# Patient Record
Sex: FEMALE | Race: WHITE | NOT HISPANIC OR LATINO
[De-identification: names, ages, dates, MRNs, and addresses within clinical notes are randomized per-mention and may not be internally consistent; named-entity substitution may affect disease eponyms.]

---

## 2018-09-10 ENCOUNTER — TRANSCRIPTION ENCOUNTER (OUTPATIENT)
Age: 63
End: 2018-09-10

## 2022-11-21 ENCOUNTER — APPOINTMENT (OUTPATIENT)
Dept: ORTHOPEDIC SURGERY | Facility: CLINIC | Age: 67
End: 2022-11-21

## 2022-11-21 PROCEDURE — 99213 OFFICE O/P EST LOW 20 MIN: CPT | Mod: 25

## 2022-11-21 PROCEDURE — 76942 ECHO GUIDE FOR BIOPSY: CPT | Mod: RT

## 2022-11-21 PROCEDURE — 20551 NJX 1 TENDON ORIGIN/INSJ: CPT

## 2022-11-21 PROCEDURE — 76882 US LMTD JT/FCL EVL NVASC XTR: CPT

## 2022-11-21 PROCEDURE — J3490M: CUSTOM

## 2022-11-21 NOTE — HISTORY OF PRESENT ILLNESS
[de-identified] : Patient returns for her right plantar fasciitis. She last had an injection on 3/28/22 with great success. Pain returned recently. WB without assistive device. No new injury/trauma. Reports start-up pain.

## 2022-11-21 NOTE — PROCEDURE
[FreeTextEntry3] : Patient has tried OTC's including aspirin, Ibuprofen, Aleve, etc or prescription NSAIDS, and/or exercises at home and/or physical therapy without satisfactory response and the risks benefits, and alternatives have been discussed, and verbal consent was obtained.\par \par The risks, benefits and contents of the injection have been discussed.  Risks include but are not limited to allergic reaction, flare reaction, permanent white skin discoloration at the injection site and infection.  The patient understands the risks and agrees to having the injection.  All questions have been answered.\par \par An injection of the plantar fascia insertion was performed. The indication for this procedure was pain and inflammation. The site was prepped with alcohol and sterile technique used. An injection of 1cc Lidocaine 1% , 1cc of Bupivacaine (Marcaine) 0.5% , and 1cc of 80mg Methylprednisolone (Depomedrol) was used. Patient tolerated procedure well. Patient was advised to call if redness, pain, or fever occur, apply ice for 15 minutes out of every hour for the next 12-24 hours as tolerated and patient was advised to rest the joint(s) for 3 days.\par \par Ultrasound guidance was indicated for this patient due to inflammation . Plantar fascia thickness measured 0.31 cm.  All ultrasound images have been permanently captured and stored accordingly in our picture archiving and communication system. Visualization of the needle and placement of injection was performed without complication.\par \par Patient has been advised to ice the plantar heel for multiple 20 minute intervals throughout the day. No sports, running, jumping or exercise activities should be done until re-evaluated. Only light stretching exercises are permitted.\par

## 2022-11-21 NOTE — PHYSICAL EXAM
[Right] : right foot and ankle [NL (40)] : plantar flexion 40 degrees [NL 30)] : inversion 30 degrees [NL (20)] : eversion 20 degrees [5___] : eversion 5[unfilled]/5 [2+] : posterior tibialis pulse: 2+ [Normal] : saphenous nerve sensation normal [] : non-antalgic [TWNoteComboBox7] : dorsiflexion 15 degrees

## 2023-04-26 ENCOUNTER — APPOINTMENT (OUTPATIENT)
Dept: ORTHOPEDIC SURGERY | Facility: CLINIC | Age: 68
End: 2023-04-26
Payer: COMMERCIAL

## 2023-04-26 DIAGNOSIS — I10 ESSENTIAL (PRIMARY) HYPERTENSION: ICD-10-CM

## 2023-04-26 DIAGNOSIS — M72.2 PLANTAR FASCIAL FIBROMATOSIS: ICD-10-CM

## 2023-04-26 PROCEDURE — 76881 US COMPL JOINT R-T W/IMG: CPT

## 2023-04-26 PROCEDURE — 76942 ECHO GUIDE FOR BIOPSY: CPT | Mod: RT

## 2023-04-26 PROCEDURE — 99213 OFFICE O/P EST LOW 20 MIN: CPT | Mod: 25

## 2023-04-26 PROCEDURE — 20551 NJX 1 TENDON ORIGIN/INSJ: CPT

## 2023-04-26 PROCEDURE — J3490M: CUSTOM

## 2023-04-26 RX ORDER — LOSARTAN POTASSIUM 25 MG/1
25 TABLET, FILM COATED ORAL
Refills: 0 | Status: ACTIVE | COMMUNITY

## 2023-04-26 NOTE — PROCEDURE
[FreeTextEntry3] : Patient has tried OTC's including aspirin, Ibuprofen, Aleve, etc or prescription NSAIDS, and/or exercises at home and/or physical therapy without satisfactory response and the risks benefits, and alternatives have been discussed, and verbal consent was obtained.\par \par The risks, benefits and contents of the injection have been discussed.  Risks include but are not limited to allergic reaction, flare reaction, permanent white skin discoloration at the injection site and infection.  The patient understands the risks and agrees to having the injection.  All questions have been answered.\par \par An injection of the plantar fascia insertion was performed. The indication for this procedure was pain and inflammation. The site was prepped with alcohol and sterile technique used. An injection of 1cc Lidocaine 1% , 1cc of Bupivacaine (Marcaine) 0.5% , and 1cc of 80mg Methylprednisolone (Depomedrol) was used. Patient tolerated procedure well. Patient was advised to call if redness, pain, or fever occur, apply ice for 15 minutes out of every hour for the next 12-24 hours as tolerated and patient was advised to rest the joint(s) for 3 days.\par \par Ultrasound guidance was indicated for this patient due to inflammation . Plantar fascia thickness measured 0.42 cm.  All ultrasound images have been permanently captured and stored accordingly in our picture archiving and communication system. Visualization of the needle and placement of injection was performed without complication.\par \par Patient has been advised to ice the plantar heel for multiple 20 minute intervals throughout the day. No sports, running, jumping or exercise activities should be done until re-evaluated. Only light stretching exercises are permitted.\par

## 2023-04-26 NOTE — HISTORY OF PRESENT ILLNESS
[5] : 5 [7] : 7 [de-identified] : Patient returns for her right plantar fasciitis. She last had an injection on 11/21/22 with great success. Pain returned recently (mid-April 2023). WB without assistive device. No new injury/trauma. Reports start-up pain.

## 2023-04-26 NOTE — PHYSICAL EXAM
[Right] : right foot and ankle [NL (40)] : plantar flexion 40 degrees [NL 30)] : inversion 30 degrees [NL (20)] : eversion 20 degrees [5___] : eversion 5[unfilled]/5 [2+] : posterior tibialis pulse: 2+ [Normal] : saphenous nerve sensation normal [] : no pain when stressing lateral tarsal metatarsal joint [TWNoteComboBox7] : dorsiflexion 15 degrees

## 2023-05-02 ENCOUNTER — APPOINTMENT (OUTPATIENT)
Dept: ORTHOPEDIC SURGERY | Facility: CLINIC | Age: 68
End: 2023-05-02
Payer: COMMERCIAL

## 2023-05-02 VITALS — HEIGHT: 65 IN | WEIGHT: 168.5 LBS | BODY MASS INDEX: 28.07 KG/M2

## 2023-05-02 DIAGNOSIS — M17.11 UNILATERAL PRIMARY OSTEOARTHRITIS, RIGHT KNEE: ICD-10-CM

## 2023-05-02 DIAGNOSIS — Z63.4 DISAPPEARANCE AND DEATH OF FAMILY MEMBER: ICD-10-CM

## 2023-05-02 DIAGNOSIS — I10 ESSENTIAL (PRIMARY) HYPERTENSION: ICD-10-CM

## 2023-05-02 DIAGNOSIS — Z87.891 PERSONAL HISTORY OF NICOTINE DEPENDENCE: ICD-10-CM

## 2023-05-02 PROCEDURE — 99214 OFFICE O/P EST MOD 30 MIN: CPT | Mod: 25

## 2023-05-02 PROCEDURE — 20610 DRAIN/INJ JOINT/BURSA W/O US: CPT | Mod: RT

## 2023-05-02 PROCEDURE — 73564 X-RAY EXAM KNEE 4 OR MORE: CPT | Mod: RT

## 2023-05-02 PROCEDURE — J3490M: CUSTOM | Mod: RT

## 2023-05-02 RX ORDER — MELOXICAM 15 MG/1
15 TABLET ORAL
Qty: 30 | Refills: 0 | Status: COMPLETED | COMMUNITY
Start: 2023-05-02 | End: 2023-06-01

## 2023-05-02 SDOH — SOCIAL STABILITY - SOCIAL INSECURITY: DISSAPEARANCE AND DEATH OF FAMILY MEMBER: Z63.4

## 2023-05-02 NOTE — DISCUSSION/SUMMARY
[de-identified] : Progress note completed by Lillie Galindo PA-C under the direct supervision of Curtis Lopez MD.\par

## 2023-05-02 NOTE — ASSESSMENT
[FreeTextEntry1] : 5/2/23: X-rays today - mild med and PF joint space narrowing\par Pathology and treatment options reviewed with patient.\par Rx for Mobic.\par Right knee CSI tolerated well.\par May need MRI if symptoms persist.\par Return in 4-6 weeks.\par \par The documentation recorded by the scribe accurately reflects the service I personally performed and the decisions made by me.\par I, Ervin Ford, attest that this documentation has been prepared under the direction and in the presence of Provider Curtis Lopez MD.\par \par The patient was seen by Curtis Lopez MD.\par The following radiographs were ordered and read by me during this patient's visit. I reviewed each radiograph in detail with the patient, and discussed the findings as highlighted below.\par

## 2023-05-02 NOTE — PROCEDURE
[FreeTextEntry3] : \par A Large joint injection was performed of the [RIGHT KNEE]. The indication for this procedure was pain and inflammation, and patient had decreased mobility in the joint. Risks, benefits and alternatives to a steroid injection procedure were discussed; Risks outlined include but are not limited to infection, sepsis, bleeding, scarring, skin discoloration, temporary increase in pain, syncopal episode, failure to resolve symptoms, allergic reaction, symptom recurrence, and elevation of blood sugar in diabetics.. All questions were answered to the patient's apparent satisfaction and informed consent obtained. Prior to injection a 'Time Out' was conducted in accordance with [facility] policy and the site and nature of procedure verified with the patient. \par  \par Procedure: The area of injection was prepared in a sterile fashion. The injection and aspiration was carried out utilizing sterile technique. The site was prepped with alcohol, betadine, ethyl chloride sprayed topically and sterile technique used.\par  \par ( X ) 1cc of Celestone(30mg/ml) \par (  )   1cc of 0.5% Bupivacaine \par (  )   2cc of 1% Lidocaine \par ( X ) 2cc of 1% Lidocaine \par \par Patient tolerated the procedure well and direct pressure was applied for hemostasis. The patient was reminded of potential post-injection risks including, but not limited to, delayed hypersensitivity reactions and/or infection. Ice tonight to the injection site.\par \par

## 2023-05-02 NOTE — HISTORY OF PRESENT ILLNESS
[Result of repetitive motion] : result of repetitive motion [8] : 8 [4] : 4 [Dull/Aching] : dull/aching [Constant] : constant [Rest] : rest [Meds] : meds [Ice] : ice [Walking] : walking [Exercising] : exercising [Stairs] : stairs [de-identified] : 5/2/23: 66 y/o female c/o right knee pain for the past 3 weeks. No specific injury. Describes anterior knee pain that is worse with activity. Difficulty with stairs. Denies buckling/locking. She has been taking Mobic PRN with relief. History of left knee OA. [] : no [FreeTextEntry5] : pain for 3 weeks , no injury  [de-identified] : Alive